# Patient Record
(demographics unavailable — no encounter records)

---

## 2018-03-29 NOTE — NUR
PATIENT PRESENTS TO ED WITH C/O GENERALIZED RASH OVER ENTIRE BODY.  STARTED 
LAST NOC, BUT HAS PROGRESSED. 

NO MED HX

PT DENIES N/V/D; AAOX4 WITH EVEN AND STEADY GAIT; LUNGS CLEAR BL; HR EVEN AND 
REGULAR; PT DENIES ANY FEVER, CP, SOB, OR COUGH AT THIS TIME; PATIENT STATES 
PAIN OF 0/10 AT THIS TIME; VSS; PATIENT POSITIONED FOR COMFORT; HOB ELEVATED; 
BEDRAILS UP X2; BED DOWN. ER MD MADE AWARE OF PT STATUS.

## 2018-03-30 NOTE — NUR
Patient discharged with v/s stable. Written and verbal after care instructions 
given and explained. 

Patient alert, oriented and verbalized understanding of instructions. 
Ambulatory with steady gait. All questions addressed prior to discharge. ID 
band removed. Patient advised to follow up with PMD. Rx of METHYLPREDNISOLONE, 
BENADRYL given. Patient educated on indication of medication including possible 
reaction and side effects. Opportunity to ask questions provided and answered.